# Patient Record
Sex: MALE | Race: BLACK OR AFRICAN AMERICAN | Employment: FULL TIME | URBAN - METROPOLITAN AREA
[De-identification: names, ages, dates, MRNs, and addresses within clinical notes are randomized per-mention and may not be internally consistent; named-entity substitution may affect disease eponyms.]

---

## 2023-02-20 ENCOUNTER — HOSPITAL ENCOUNTER (EMERGENCY)
Age: 28
Discharge: HOME | End: 2023-02-20
Attending: STUDENT IN AN ORGANIZED HEALTH CARE EDUCATION/TRAINING PROGRAM
Payer: COMMERCIAL

## 2023-02-20 VITALS
TEMPERATURE: 98.4 F | RESPIRATION RATE: 14 BRPM | HEART RATE: 75 BPM | SYSTOLIC BLOOD PRESSURE: 138 MMHG | DIASTOLIC BLOOD PRESSURE: 95 MMHG | OXYGEN SATURATION: 99 %

## 2023-02-20 DIAGNOSIS — R52 GENERALIZED BODY ACHES: ICD-10-CM

## 2023-02-20 DIAGNOSIS — H72.91 TYMPANIC MEMBRANE PERFORATION, NONTRAUMATIC, RIGHT: Primary | ICD-10-CM

## 2023-02-20 LAB
FLUAV RNA SPEC QL NAA+PROBE: NOT DETECTED
FLUBV RNA SPEC QL NAA+PROBE: NOT DETECTED
SARS-COV-2 RDRP RESP QL NAA+PROBE: NOT DETECTED
SOURCE: NORMAL

## 2023-02-20 PROCEDURE — 99283 EMERGENCY DEPT VISIT LOW MDM: CPT

## 2023-02-20 PROCEDURE — 87502 INFLUENZA DNA AMP PROBE: CPT

## 2023-02-20 PROCEDURE — 87635 SARS-COV-2 COVID-19 AMP PRB: CPT

## 2023-02-20 RX ORDER — OFLOXACIN 3 MG/ML
5 SOLUTION AURICULAR (OTIC) 2 TIMES DAILY
Qty: 5 ML | Refills: 0 | Status: SHIPPED | OUTPATIENT
Start: 2023-02-20 | End: 2023-03-02

## 2023-02-20 ASSESSMENT — ENCOUNTER SYMPTOMS
CHEST TIGHTNESS: 0
WHEEZING: 0
COLOR CHANGE: 0
ABDOMINAL PAIN: 0
VOMITING: 0
DIARRHEA: 0
NAUSEA: 0
SHORTNESS OF BREATH: 0

## 2023-02-20 ASSESSMENT — LIFESTYLE VARIABLES
HOW OFTEN DO YOU HAVE A DRINK CONTAINING ALCOHOL: NEVER
HOW MANY STANDARD DRINKS CONTAINING ALCOHOL DO YOU HAVE ON A TYPICAL DAY: PATIENT DOES NOT DRINK

## 2023-02-20 ASSESSMENT — PAIN SCALES - GENERAL: PAINLEVEL_OUTOF10: 4

## 2023-02-20 NOTE — ED TRIAGE NOTES
Pt c/o R ear pain and fullness as well as generalized body aches. Pt denies sore throat, denies n&v, denies headaches, denies nasal congestion, denies sob and chest pain.

## 2023-02-20 NOTE — ED PROVIDER NOTES
Emergency Department Provider Note                   PCP:                Pcp No               Age: 33 y.o. Sex: male       ICD-10-CM    1. Tympanic membrane perforation, nontraumatic, right  H72.91       2. Generalized body aches  R52           DISPOSITION Decision To Discharge 02/20/2023 12:06:21 PM        Medical Decision Making  26-year-old male with no prior medical history presenting for evaluation of bilateral ear discomfort and generalized body aches for the past 2 days. States some diminished hearing on the right side. Denies any known sick contacts. Patient's vital signs stable upon arrival.  Exam with a small perforation of the right TM. No associated effusion or erythema. This is thought to be the origin of his discomfort, however, we will also obtain COVID and flu swabs. COVID and flu ultimately negative. Given his perforation, we will place him on ofloxacin drops. Advised symptomatic care otherwise. Return precautions discussed. Patient verbalizes understanding and is agreeable to this plan. Risk  Prescription drug management. Complexity of Problem: 1 self limited or minor problem. (2)          Orders Placed This Encounter   Procedures    COVID-19, Rapid    Influenza A/B, Molecular        Medications - No data to display    Discharge Medication List as of 2/20/2023  1:07 PM        START taking these medications    Details   ofloxacin (FLOXIN OTIC) 0.3 % otic solution Place 5 drops in ear(s) 2 times daily for 10 days, Disp-5 mL, R-0Normal              Armand Shields is a 33 y.o. male who presents to the Emergency Department with chief complaint of    Chief Complaint   Patient presents with    Otalgia    Generalized Body Aches      Patient is a 26-year-old male with no prior medical history presenting to the emergency department for evaluation of bilateral ear fullness/discomfort and generalized myalgias. Patient states the symptoms began approximately 2 days prior.   He denies any cough, shortness of breath, fever or chills. Denies any known sick contacts. States his hearing is somewhat diminished on the right side. States his pain is worsened on the side as well. He denies any trauma or other contributing incidents. He has no other complaints today. The history is provided by the patient. Review of Systems   Constitutional:  Negative for chills, fatigue and fever. HENT:  Positive for ear pain. Eyes:  Negative for visual disturbance. Respiratory:  Negative for chest tightness, shortness of breath and wheezing. Cardiovascular:  Negative for chest pain. Gastrointestinal:  Negative for abdominal pain, diarrhea, nausea and vomiting. Genitourinary:  Negative for dysuria. Musculoskeletal:  Positive for myalgias. Negative for arthralgias. Skin:  Negative for color change. Neurological:  Negative for dizziness, syncope, weakness, light-headedness and headaches. All other systems reviewed and are negative. History reviewed. No pertinent past medical history. History reviewed. No pertinent surgical history. History reviewed. No pertinent family history. Social History     Socioeconomic History    Marital status: Single     Spouse name: None    Number of children: None    Years of education: None    Highest education level: None         Patient has no known allergies. Discharge Medication List as of 2/20/2023  1:07 PM           Vitals signs and nursing note reviewed. No data found. Physical Exam  Vitals and nursing note reviewed. Constitutional:       General: He is not in acute distress. Appearance: Normal appearance. He is not ill-appearing or diaphoretic. HENT:      Head: Normocephalic and atraumatic. Right Ear: Ear canal and external ear normal. There is no impacted cerumen. Left Ear: Tympanic membrane, ear canal and external ear normal. There is no impacted cerumen.       Ears:      Comments: Small perforation of the right tympanic membrane. No effusion, erythema, or bulging. Nose: Nose normal.   Eyes:      General: No scleral icterus. Right eye: No discharge. Left eye: No discharge. Extraocular Movements: Extraocular movements intact. Conjunctiva/sclera: Conjunctivae normal.   Cardiovascular:      Rate and Rhythm: Normal rate and regular rhythm. Pulses: Normal pulses. Heart sounds: Normal heart sounds. Pulmonary:      Effort: Pulmonary effort is normal.      Breath sounds: Normal breath sounds. Abdominal:      General: Abdomen is flat. Palpations: Abdomen is soft. Tenderness: There is no abdominal tenderness. Musculoskeletal:         General: Normal range of motion. Cervical back: Normal range of motion and neck supple. Skin:     General: Skin is warm and dry. Neurological:      General: No focal deficit present. Mental Status: He is alert and oriented to person, place, and time. Psychiatric:         Mood and Affect: Mood normal.         Behavior: Behavior normal.        Procedures    Results for orders placed or performed during the hospital encounter of 02/20/23   COVID-19, Rapid    Specimen: Nasopharyngeal   Result Value Ref Range    Source Nasopharyngeal      SARS-CoV-2, Rapid Not detected NOTD     Influenza A/B, Molecular    Specimen: Not Specified   Result Value Ref Range    Influenza A, FREDERICK Not detected NOTD      Influenza B, FREDERICK Not detected NOTD          No orders to display                       Voice dictation software was used during the making of this note. This software is not perfect and grammatical and other typographical errors may be present. This note has not been completely proofread for errors.       Giuliano Flood, [place]  02/20/23 [id number]

## 2023-02-20 NOTE — DISCHARGE INSTRUCTIONS
You were evaluated today in the emergency department with bilateral ear discomfort and generalized body aches. Your vital signs today were normal.  Exam did reveal a small perforation of your right eardrum. COVID and flu swabs today were negative. It is likely your symptoms may be attributable to another respiratory virus that we do not routinely test for. This may have also contributed to your eardrum rupture. I will send you home with some antibiotic eardrops to prevent this from getting infected. Otherwise, avoid getting water into this ear is much as possible.